# Patient Record
Sex: MALE | Race: WHITE | NOT HISPANIC OR LATINO | URBAN - METROPOLITAN AREA
[De-identification: names, ages, dates, MRNs, and addresses within clinical notes are randomized per-mention and may not be internally consistent; named-entity substitution may affect disease eponyms.]

---

## 2024-10-09 ENCOUNTER — NURSING HOME VISIT (OUTPATIENT)
Dept: POST ACUTE CARE | Facility: EXTERNAL LOCATION | Age: 64
End: 2024-10-09
Payer: COMMERCIAL

## 2024-10-09 DIAGNOSIS — R53.1 WEAKNESS: ICD-10-CM

## 2024-10-09 DIAGNOSIS — G47.33 OSA (OBSTRUCTIVE SLEEP APNEA): ICD-10-CM

## 2024-10-09 DIAGNOSIS — J43.1 PANLOBULAR EMPHYSEMA (MULTI): ICD-10-CM

## 2024-10-09 DIAGNOSIS — I50.32 CHRONIC DIASTOLIC HEART FAILURE: ICD-10-CM

## 2024-10-09 DIAGNOSIS — E11.9 TYPE 2 DIABETES MELLITUS WITHOUT COMPLICATION, WITHOUT LONG-TERM CURRENT USE OF INSULIN (MULTI): ICD-10-CM

## 2024-10-09 DIAGNOSIS — K70.31 ALCOHOLIC CIRRHOSIS OF LIVER WITH ASCITES (MULTI): Primary | ICD-10-CM

## 2024-10-09 PROCEDURE — 99305 1ST NF CARE MODERATE MDM 35: CPT | Performed by: INTERNAL MEDICINE

## 2024-10-18 ENCOUNTER — NURSING HOME VISIT (OUTPATIENT)
Dept: POST ACUTE CARE | Facility: EXTERNAL LOCATION | Age: 64
End: 2024-10-18
Payer: COMMERCIAL

## 2024-10-18 DIAGNOSIS — E11.9 TYPE 2 DIABETES MELLITUS WITHOUT COMPLICATION, WITH LONG-TERM CURRENT USE OF INSULIN (MULTI): ICD-10-CM

## 2024-10-18 DIAGNOSIS — J43.9 PULMONARY EMPHYSEMA, UNSPECIFIED EMPHYSEMA TYPE (MULTI): Primary | ICD-10-CM

## 2024-10-18 DIAGNOSIS — Z79.4 TYPE 2 DIABETES MELLITUS WITHOUT COMPLICATION, WITH LONG-TERM CURRENT USE OF INSULIN (MULTI): ICD-10-CM

## 2024-10-18 DIAGNOSIS — K70.31 ALCOHOLIC CIRRHOSIS OF LIVER WITH ASCITES (MULTI): ICD-10-CM

## 2024-10-18 DIAGNOSIS — I50.32 CHRONIC DIASTOLIC HEART FAILURE: ICD-10-CM

## 2024-10-18 PROCEDURE — 99309 SBSQ NF CARE MODERATE MDM 30: CPT

## 2024-10-18 NOTE — LETTER
Patient: Axel Boles  : 1960    Encounter Date: 10/18/2024    PROGRESS NOTE    Subjective  Chief complaint: Axel Boles is a 64 y.o. male who is an acute skilled patient being seen and evaluated for weakness    HPI:  10/18/24  Patient offers no complaints today.  Continues to work with therapy for strengthening and GT.  Ambulated 100' X2 with rollator.  Tolerating trilogy well - 4 hours/day. + ascites- last paracentesis 2 weeks ago.  No F/C/N/V/D, SOB.          Objective  Vital signs:  122/68, 97.8, 73, 18, 97%    Physical Exam  Constitutional:       Appearance: Normal appearance.   HENT:      Head: Normocephalic.      Mouth/Throat:      Mouth: Mucous membranes are moist.   Eyes:      Extraocular Movements: Extraocular movements intact.   Cardiovascular:      Rate and Rhythm: Normal rate and regular rhythm.      Pulses: Normal pulses.      Heart sounds: Normal heart sounds.   Pulmonary:      Effort: Pulmonary effort is normal.      Breath sounds: Normal breath sounds.      Comments: .  Diminished throughout  Abdominal:      General: Bowel sounds are normal. There is distension (Ascites).      Palpations: Abdomen is soft.   Musculoskeletal:         General: Normal range of motion.      Cervical back: Normal range of motion and neck supple.      Right lower leg: Edema present.      Left lower leg: Edema present.   Skin:     General: Skin is warm and dry.      Capillary Refill: Capillary refill takes less than 2 seconds.      Comments: .  Wounds to BLE   Neurological:      Mental Status: He is alert and oriented to person, place, and time.   Psychiatric:         Mood and Affect: Mood normal.         Behavior: Behavior normal.         Assessment/Plan  Problem List Items Addressed This Visit       COPD (chronic obstructive pulmonary disease) (Multi) - Primary      Stable   Continuous O2   encourage increased time with trilogy   Advair, albuterol, Spiriva   monitor sats         Type 2 diabetes mellitus  without complication (Multi)         no S/S hyper or hypoglycemia   continue glipizide, lispro SSI, glargine   monitor BG AC/at bedtime   Routine HG A1c         Chronic diastolic heart failure      Stable   DIPAK diet   fluid restriction   monitor weights   BP control   Lasix, spironolactone         Alcoholic cirrhosis of liver with ascites (Multi)      Stable   Ascites- last paracentesis 2 weeks ago   monitor labs   scheduled paracentesis 10/28/2024   continue lactulose          Medications, treatments, and labs reviewed  Continue medications and treatments as listed in EMR    BEVERLY Adorno      Electronically Signed By: BEVERLY Adorno   10/20/24  9:29 AM

## 2024-10-20 PROBLEM — J43.0 UNILATERAL EMPHYSEMA (MULTI): Status: ACTIVE | Noted: 2024-10-20

## 2024-10-20 PROBLEM — R53.1 WEAKNESS: Status: ACTIVE | Noted: 2024-10-20

## 2024-10-20 PROBLEM — R23.9 ALTERATION IN SKIN INTEGRITY: Status: ACTIVE | Noted: 2024-10-20

## 2024-10-20 PROBLEM — I50.32 CHRONIC DIASTOLIC HEART FAILURE: Status: ACTIVE | Noted: 2024-10-20

## 2024-10-20 PROBLEM — J44.9 COPD (CHRONIC OBSTRUCTIVE PULMONARY DISEASE) (MULTI): Status: ACTIVE | Noted: 2024-10-20

## 2024-10-20 PROBLEM — E11.9 TYPE 2 DIABETES MELLITUS WITHOUT COMPLICATION (MULTI): Status: ACTIVE | Noted: 2024-10-20

## 2024-10-20 PROBLEM — I86.4 GASTRIC VARICES WITHOUT BLEEDING: Status: ACTIVE | Noted: 2024-10-20

## 2024-10-20 PROBLEM — B18.2 CHRONIC HEPATITIS C WITHOUT HEPATIC COMA (MULTI): Status: ACTIVE | Noted: 2024-10-20

## 2024-10-20 PROBLEM — K57.90: Status: ACTIVE | Noted: 2024-10-20

## 2024-10-20 PROBLEM — G47.33 OSA (OBSTRUCTIVE SLEEP APNEA): Status: ACTIVE | Noted: 2024-10-20

## 2024-10-20 PROBLEM — K70.31 ALCOHOLIC CIRRHOSIS OF LIVER WITH ASCITES (MULTI): Status: ACTIVE | Noted: 2024-10-20

## 2024-10-20 NOTE — ASSESSMENT & PLAN NOTE
Stable   Ascites- last paracentesis 2 weeks ago   monitor labs   scheduled paracentesis 10/28/2024   continue lactulose

## 2024-10-20 NOTE — ASSESSMENT & PLAN NOTE
Stable   Continuous O2   encourage increased time with trilogy   Advair, albuterol, Spiriva   monitor sats

## 2024-10-20 NOTE — PROGRESS NOTES
PROGRESS NOTE    Subjective   Chief complaint: Axel Boles is a 64 y.o. male who is an acute skilled patient being seen and evaluated for weakness    HPI:  10/18/24  Patient offers no complaints today.  Continues to work with therapy for strengthening and GT.  Ambulated 100' X2 with rollator.  Tolerating trilogy well - 4 hours/day. + ascites- last paracentesis 2 weeks ago.  No F/C/N/V/D, SOB.          Objective   Vital signs:  122/68, 97.8, 73, 18, 97%    Physical Exam  Constitutional:       Appearance: Normal appearance.   HENT:      Head: Normocephalic.      Mouth/Throat:      Mouth: Mucous membranes are moist.   Eyes:      Extraocular Movements: Extraocular movements intact.   Cardiovascular:      Rate and Rhythm: Normal rate and regular rhythm.      Pulses: Normal pulses.      Heart sounds: Normal heart sounds.   Pulmonary:      Effort: Pulmonary effort is normal.      Breath sounds: Normal breath sounds.      Comments: .  Diminished throughout  Abdominal:      General: Bowel sounds are normal. There is distension (Ascites).      Palpations: Abdomen is soft.   Musculoskeletal:         General: Normal range of motion.      Cervical back: Normal range of motion and neck supple.      Right lower leg: Edema present.      Left lower leg: Edema present.   Skin:     General: Skin is warm and dry.      Capillary Refill: Capillary refill takes less than 2 seconds.      Comments: .  Wounds to BLE   Neurological:      Mental Status: He is alert and oriented to person, place, and time.   Psychiatric:         Mood and Affect: Mood normal.         Behavior: Behavior normal.         Assessment/Plan   Problem List Items Addressed This Visit       COPD (chronic obstructive pulmonary disease) (Multi) - Primary      Stable   Continuous O2   encourage increased time with trilogy   Advair, albuterol, Spiriva   monitor sats         Type 2 diabetes mellitus without complication (Multi)         no S/S hyper or hypoglycemia    continue glipizide, lispro SSI, glargine   monitor BG AC/at bedtime   Routine HG A1c         Chronic diastolic heart failure      Stable   DIPAK diet   fluid restriction   monitor weights   BP control   Lasix, spironolactone         Alcoholic cirrhosis of liver with ascites (Multi)      Stable   Ascites- last paracentesis 2 weeks ago   monitor labs   scheduled paracentesis 10/28/2024   continue lactulose          Medications, treatments, and labs reviewed  Continue medications and treatments as listed in EMR    Dolores Miles, APRN-CNP

## 2024-10-20 NOTE — ASSESSMENT & PLAN NOTE
no S/S hyper or hypoglycemia   continue glipizide, lispro SSI, glargine   monitor BG AC/at bedtime   Routine HG A1c

## 2024-10-22 ENCOUNTER — NURSING HOME VISIT (OUTPATIENT)
Dept: POST ACUTE CARE | Facility: EXTERNAL LOCATION | Age: 64
End: 2024-10-22
Payer: COMMERCIAL

## 2024-10-22 DIAGNOSIS — E11.9 TYPE 2 DIABETES MELLITUS WITHOUT COMPLICATION, WITHOUT LONG-TERM CURRENT USE OF INSULIN (MULTI): ICD-10-CM

## 2024-10-22 DIAGNOSIS — J41.8 MIXED SIMPLE AND MUCOPURULENT CHRONIC BRONCHITIS (MULTI): ICD-10-CM

## 2024-10-22 DIAGNOSIS — I50.32 CHRONIC DIASTOLIC HEART FAILURE: ICD-10-CM

## 2024-10-22 DIAGNOSIS — R23.9 ALTERATION IN SKIN INTEGRITY: ICD-10-CM

## 2024-10-22 DIAGNOSIS — L60.0 INGROWN TOENAIL: ICD-10-CM

## 2024-10-22 DIAGNOSIS — H26.9 CATARACT OF BOTH EYES, UNSPECIFIED CATARACT TYPE: Primary | ICD-10-CM

## 2024-10-22 DIAGNOSIS — R53.1 WEAKNESS: ICD-10-CM

## 2024-10-22 PROCEDURE — 99309 SBSQ NF CARE MODERATE MDM 30: CPT

## 2024-10-22 NOTE — LETTER
Patient: Axel Boles  : 1960    Encounter Date: 10/22/2024    PROGRESS NOTE    Subjective  Chief complaint: Axel Boles is a 64 y.o. male who is an acute skilled patient being seen and evaluated for weakness    HPI:  10/18/24  Patient offers no complaints today.  Continues to work with therapy for strengthening and GT.  Ambulated 100' X2 with rollator.  Tolerating trilogy well - 4 hours/day. + ascites- last paracentesis 2 weeks ago.  No F/C/N/V/D, SOB.    10/22/24 Patient continues to work with therapy for strengthening and gait training.  Tolerating Trelegy well. DM stable - .  No F/C/N/C/C, SOB.          Objective  Vital signs:   132/78, 97.2, 79, 18, 98%    Physical Exam  Constitutional:       Appearance: Normal appearance.   HENT:      Head: Normocephalic.      Mouth/Throat:      Mouth: Mucous membranes are moist.   Eyes:      Extraocular Movements: Extraocular movements intact.   Cardiovascular:      Rate and Rhythm: Normal rate and regular rhythm.      Pulses: Normal pulses.      Heart sounds: Normal heart sounds.   Pulmonary:      Effort: Pulmonary effort is normal.      Breath sounds: Normal breath sounds.      Comments: .  Diminished throughout  Abdominal:      General: Bowel sounds are normal. There is distension (Ascites).      Palpations: Abdomen is soft.   Musculoskeletal:         General: Normal range of motion.      Cervical back: Normal range of motion and neck supple.      Right lower leg: Edema present.      Left lower leg: Edema present.   Skin:     General: Skin is warm and dry.      Capillary Refill: Capillary refill takes less than 2 seconds.      Comments: .  Wounds to BLE   Neurological:      Mental Status: He is alert and oriented to person, place, and time.   Psychiatric:         Mood and Affect: Mood normal.         Behavior: Behavior normal.         Assessment/Plan  Problem List Items Addressed This Visit       COPD (chronic obstructive pulmonary disease) (Multi)       Stable   Continuous O2   encourage increased time with trilogy   Advair, albuterol, Spiriva   monitor sats         Type 2 diabetes mellitus without complication (Multi)         no S/S hyper or hypoglycemia   continue glipizide, lispro SSI, glargine   monitor BG AC/at bedtime   Routine HG A1c         Chronic diastolic heart failure      Stable   DIPAK diet   fluid restriction   monitor weights   BP control   Lasix, spironolactone         Weakness      therapy         Alteration in skin integrity      cleanse wound with NS, pat dry   Aquaphor         Cataracts, bilateral - Primary      Patient states difficulty driving due to nighttime glare and dark vision for > 1 year   diagnosed years ago with early stage cataracts with no further follow up   360 ophthalmology consult           Ingrown toenail      podiatry consult          Medications, treatments, and labs reviewed  Continue medications and treatments as listed in EMR    BEVERLY Adorno      Electronically Signed By: BEVERLY Adorno   10/22/24 10:41 PM

## 2024-10-23 NOTE — ASSESSMENT & PLAN NOTE
Patient states difficulty driving due to nighttime glare and dark vision for > 1 year   diagnosed years ago with early stage cataracts with no further follow up   360 ophthalmology consult

## 2024-10-23 NOTE — PROGRESS NOTES
PROGRESS NOTE    Subjective   Chief complaint: Axel Boles is a 64 y.o. male who is an acute skilled patient being seen and evaluated for weakness    HPI:  10/18/24  Patient offers no complaints today.  Continues to work with therapy for strengthening and GT.  Ambulated 100' X2 with rollator.  Tolerating trilogy well - 4 hours/day. + ascites- last paracentesis 2 weeks ago.  No F/C/N/V/D, SOB.    10/22/24 Patient continues to work with therapy for strengthening and gait training.  Tolerating Trelegy well. DM stable - .  No F/C/N/C/C, SOB.          Objective   Vital signs:   132/78, 97.2, 79, 18, 98%    Physical Exam  Constitutional:       Appearance: Normal appearance.   HENT:      Head: Normocephalic.      Mouth/Throat:      Mouth: Mucous membranes are moist.   Eyes:      Extraocular Movements: Extraocular movements intact.   Cardiovascular:      Rate and Rhythm: Normal rate and regular rhythm.      Pulses: Normal pulses.      Heart sounds: Normal heart sounds.   Pulmonary:      Effort: Pulmonary effort is normal.      Breath sounds: Normal breath sounds.      Comments: .  Diminished throughout  Abdominal:      General: Bowel sounds are normal. There is distension (Ascites).      Palpations: Abdomen is soft.   Musculoskeletal:         General: Normal range of motion.      Cervical back: Normal range of motion and neck supple.      Right lower leg: Edema present.      Left lower leg: Edema present.   Skin:     General: Skin is warm and dry.      Capillary Refill: Capillary refill takes less than 2 seconds.      Comments: .  Wounds to BLE   Neurological:      Mental Status: He is alert and oriented to person, place, and time.   Psychiatric:         Mood and Affect: Mood normal.         Behavior: Behavior normal.         Assessment/Plan   Problem List Items Addressed This Visit       COPD (chronic obstructive pulmonary disease) (Multi)      Stable   Continuous O2   encourage increased time with trilogy    Advair, albuterol, Spiriva   monitor sats         Type 2 diabetes mellitus without complication (Multi)         no S/S hyper or hypoglycemia   continue glipizide, lispro SSI, glargine   monitor BG AC/at bedtime   Routine HG A1c         Chronic diastolic heart failure      Stable   DIPAK diet   fluid restriction   monitor weights   BP control   Lasix, spironolactone         Weakness      therapy         Alteration in skin integrity      cleanse wound with NS, pat dry   Aquaphor         Cataracts, bilateral - Primary      Patient states difficulty driving due to nighttime glare and dark vision for > 1 year   diagnosed years ago with early stage cataracts with no further follow up   360 ophthalmology consult           Ingrown toenail      podiatry consult          Medications, treatments, and labs reviewed  Continue medications and treatments as listed in EMR    Dolores Miles, APRN-CNP

## 2024-10-24 ENCOUNTER — DOCUMENTATION (OUTPATIENT)
Dept: PULMONOLOGY | Facility: HOSPITAL | Age: 64
End: 2024-10-24
Payer: COMMERCIAL

## 2024-10-29 ENCOUNTER — NURSING HOME VISIT (OUTPATIENT)
Dept: POST ACUTE CARE | Facility: EXTERNAL LOCATION | Age: 64
End: 2024-10-29
Payer: COMMERCIAL

## 2024-10-29 DIAGNOSIS — J41.8 MIXED SIMPLE AND MUCOPURULENT CHRONIC BRONCHITIS (MULTI): ICD-10-CM

## 2024-10-29 DIAGNOSIS — K70.31 ALCOHOLIC CIRRHOSIS OF LIVER WITH ASCITES (MULTI): Primary | ICD-10-CM

## 2024-10-29 DIAGNOSIS — Z79.4 TYPE 2 DIABETES MELLITUS WITHOUT COMPLICATION, WITH LONG-TERM CURRENT USE OF INSULIN (MULTI): ICD-10-CM

## 2024-10-29 DIAGNOSIS — R04.0 NASAL BLEEDING: ICD-10-CM

## 2024-10-29 DIAGNOSIS — E11.9 TYPE 2 DIABETES MELLITUS WITHOUT COMPLICATION, WITH LONG-TERM CURRENT USE OF INSULIN (MULTI): ICD-10-CM

## 2024-10-29 PROCEDURE — 99309 SBSQ NF CARE MODERATE MDM 30: CPT

## 2024-11-19 ENCOUNTER — NURSING HOME VISIT (OUTPATIENT)
Dept: POST ACUTE CARE | Facility: EXTERNAL LOCATION | Age: 64
End: 2024-11-19
Payer: COMMERCIAL

## 2024-11-19 DIAGNOSIS — I50.32 CHRONIC DIASTOLIC HEART FAILURE: ICD-10-CM

## 2024-11-19 DIAGNOSIS — E11.9 TYPE 2 DIABETES MELLITUS WITHOUT COMPLICATION, WITHOUT LONG-TERM CURRENT USE OF INSULIN (MULTI): ICD-10-CM

## 2024-11-19 DIAGNOSIS — K70.31 ALCOHOLIC CIRRHOSIS OF LIVER WITH ASCITES (MULTI): Primary | ICD-10-CM

## 2024-11-19 DIAGNOSIS — J41.8 MIXED SIMPLE AND MUCOPURULENT CHRONIC BRONCHITIS (MULTI): ICD-10-CM

## 2024-11-19 PROCEDURE — 99309 SBSQ NF CARE MODERATE MDM 30: CPT

## 2024-11-19 NOTE — LETTER
Patient: Axel Boles  : 1960    Encounter Date: 2024    PROGRESS NOTE    Subjective  Chief complaint: Axel Boles is a 64 y.o. male who is a long term care patient being seen and evaluated for general medical care and monthly follow up    HPI:  Patient seen and evaluated for general medical care and monthly follow-up.  Patient continues working on tolerance for trilogy with O2 2L NC in between.  States improvements.  Recent labs WNL.  Offers no complaints of F/C/N/V/D, SOB, chest pain.  Abdomen remains soft.  Appetite stable          Objective  Vital signs:  111/65, 97.1, 82, 18, 95% - 114    Physical Exam  Constitutional:       Appearance: Normal appearance.   HENT:      Head: Normocephalic.      Mouth/Throat:      Mouth: Mucous membranes are moist.   Eyes:      Extraocular Movements: Extraocular movements intact.   Cardiovascular:      Rate and Rhythm: Normal rate and regular rhythm.      Pulses: Normal pulses.      Heart sounds: Normal heart sounds.   Pulmonary:      Effort: Pulmonary effort is normal.      Breath sounds: Normal breath sounds.      Comments: .  Diminished throughout  Abdominal:      General: Bowel sounds are normal. There is distension (Ascites).      Palpations: Abdomen is soft.   Musculoskeletal:         General: Normal range of motion.      Cervical back: Normal range of motion and neck supple.      Right lower leg: Edema present.      Left lower leg: Edema present.   Skin:     General: Skin is warm and dry.      Capillary Refill: Capillary refill takes less than 2 seconds.      Comments: .  Wounds to BLE   Neurological:      Mental Status: He is alert and oriented to person, place, and time.   Psychiatric:         Mood and Affect: Mood normal.         Behavior: Behavior normal.         Assessment/Plan  Problem List Items Addressed This Visit       COPD (chronic obstructive pulmonary disease) (Multi)      Continuous O2   encourage increased time with trilogy   Advair,  albuterol, Spiriva   monitor sats   Pulmonology following         Type 2 diabetes mellitus without complication (Multi)         no S/S hyper or hypoglycemia   continue glipizide, lispro SSI, glargine   monitor BG AC/at bedtime   Routine HG A1c         Chronic diastolic heart failure      Stable   DIPAK diet   fluid restriction   monitor weights   BP control   Lasix, spironolactone         Alcoholic cirrhosis of liver with ascites (Multi) - Primary     No F/C/SOB, abdominal pain  Last paracentesis 10/28/2024 with 1 L removed            Medications, treatments, and labs reviewed  Continue medications and treatments as listed in EMR    BEVERLY Adorno      Electronically Signed By: BVEERLY Adorno   11/26/24  7:22 PM

## 2024-11-25 ENCOUNTER — NURSING HOME VISIT (OUTPATIENT)
Dept: POST ACUTE CARE | Facility: EXTERNAL LOCATION | Age: 64
End: 2024-11-25
Payer: COMMERCIAL

## 2024-11-25 DIAGNOSIS — R60.0 LOCALIZED EDEMA: ICD-10-CM

## 2024-11-25 DIAGNOSIS — J41.8 MIXED SIMPLE AND MUCOPURULENT CHRONIC BRONCHITIS (MULTI): ICD-10-CM

## 2024-11-25 DIAGNOSIS — E11.9 TYPE 2 DIABETES MELLITUS WITHOUT COMPLICATION, WITHOUT LONG-TERM CURRENT USE OF INSULIN (MULTI): ICD-10-CM

## 2024-11-25 DIAGNOSIS — J98.8 CONGESTION OF RESPIRATORY TRACT: Primary | ICD-10-CM

## 2024-11-25 PROCEDURE — 99309 SBSQ NF CARE MODERATE MDM 30: CPT

## 2024-11-25 NOTE — LETTER
Patient: Axel Boles  : 1960    Encounter Date: 2024    PROGRESS NOTE    Subjective  Chief complaint: Axel Boles is a 64 y.o. male who is a long term care patient being seen and evaluated for  congestion    HPI:  Patient seen and evaluated for productive cough and SOB.  CXR obtained- negative for PNA/CHF.  Pending labs.  Offers no complaints of F/C/N/V/D, chest pain, fatigue.  DM stable - .  No other concerns per nursing          Objective  Vital signs:  138/74, 97.6, 80, 18, 95%    Physical Exam  Constitutional:       Appearance: Normal appearance.   HENT:      Head: Normocephalic.      Mouth/Throat:      Mouth: Mucous membranes are moist.   Eyes:      Extraocular Movements: Extraocular movements intact.   Cardiovascular:      Rate and Rhythm: Normal rate and regular rhythm.      Pulses: Normal pulses.      Heart sounds: Normal heart sounds.   Pulmonary:      Effort: Pulmonary effort is normal.      Breath sounds: Normal breath sounds.      Comments: .  Diminished throughout  Abdominal:      General: Bowel sounds are normal. There is distension (Ascites).      Palpations: Abdomen is soft.   Musculoskeletal:         General: Normal range of motion.      Cervical back: Normal range of motion and neck supple.      Right lower leg: Edema present.      Left lower leg: Edema present.   Skin:     General: Skin is warm and dry.      Capillary Refill: Capillary refill takes less than 2 seconds.      Comments: .  Wounds to BLE   Neurological:      Mental Status: He is alert and oriented to person, place, and time.   Psychiatric:         Mood and Affect: Mood normal.         Behavior: Behavior normal.         Assessment/Plan  Problem List Items Addressed This Visit       COPD (chronic obstructive pulmonary disease) (Multi)      Continuous O2   encourage increased time with trilogy   Advair, albuterol, Spiriva   monitor sats   Pulmonology following         Type 2 diabetes mellitus without  complication (Multi)         no S/S hyper or hypoglycemia   continue glipizide, lispro SSI, glargine   monitor BG AC/at bedtime   Routine HG A1c         Congestion of respiratory tract - Primary      CXR negative for PNA/CHF   continue albuterol, guaifenesin   monitor sats         Localized edema      continue elevation and compression hose   Lasix   monitor          Medications, treatments, and labs reviewed  Continue medications and treatments as listed in EMR    BEVERLY Adorno      Electronically Signed By: BEVERLY Adorno   11/26/24  7:33 PM

## 2024-11-26 PROBLEM — R60.0 LOCALIZED EDEMA: Status: ACTIVE | Noted: 2024-11-26

## 2024-11-26 PROBLEM — J98.8 CONGESTION OF RESPIRATORY TRACT: Status: ACTIVE | Noted: 2024-11-26

## 2024-11-27 NOTE — ASSESSMENT & PLAN NOTE
Continuous O2   encourage increased time with trilogy   Advair, albuterol, Spiriva   monitor sats   Pulmonology following

## 2024-11-27 NOTE — PROGRESS NOTES
PROGRESS NOTE    Subjective   Chief complaint: Axel Boles is a 64 y.o. male who is a long term care patient being seen and evaluated for  congestion    HPI:  Patient seen and evaluated for productive cough and SOB.  CXR obtained- negative for PNA/CHF.  Pending labs.  Offers no complaints of F/C/N/V/D, chest pain, fatigue.  DM stable - .  No other concerns per nursing          Objective   Vital signs:  138/74, 97.6, 80, 18, 95%    Physical Exam  Constitutional:       Appearance: Normal appearance.   HENT:      Head: Normocephalic.      Mouth/Throat:      Mouth: Mucous membranes are moist.   Eyes:      Extraocular Movements: Extraocular movements intact.   Cardiovascular:      Rate and Rhythm: Normal rate and regular rhythm.      Pulses: Normal pulses.      Heart sounds: Normal heart sounds.   Pulmonary:      Effort: Pulmonary effort is normal.      Breath sounds: Normal breath sounds.      Comments: .  Diminished throughout  Abdominal:      General: Bowel sounds are normal. There is distension (Ascites).      Palpations: Abdomen is soft.   Musculoskeletal:         General: Normal range of motion.      Cervical back: Normal range of motion and neck supple.      Right lower leg: Edema present.      Left lower leg: Edema present.   Skin:     General: Skin is warm and dry.      Capillary Refill: Capillary refill takes less than 2 seconds.      Comments: .  Wounds to BLE   Neurological:      Mental Status: He is alert and oriented to person, place, and time.   Psychiatric:         Mood and Affect: Mood normal.         Behavior: Behavior normal.         Assessment/Plan   Problem List Items Addressed This Visit       COPD (chronic obstructive pulmonary disease) (Multi)      Continuous O2   encourage increased time with trilogy   Advair, albuterol, Spiriva   monitor sats   Pulmonology following         Type 2 diabetes mellitus without complication (Multi)         no S/S hyper or hypoglycemia   continue  glipizide, lispro SSI, glargine   monitor BG AC/at bedtime   Routine HG A1c         Congestion of respiratory tract - Primary      CXR negative for PNA/CHF   continue albuterol, guaifenesin   monitor sats         Localized edema      continue elevation and compression hose   Lasix   monitor          Medications, treatments, and labs reviewed  Continue medications and treatments as listed in EMR    Dolores Miles, APRN-CNP

## 2024-11-27 NOTE — PROGRESS NOTES
PROGRESS NOTE    Subjective   Chief complaint: Axel Boles is a 64 y.o. male who is a long term care patient being seen and evaluated for general medical care and monthly follow up    HPI:  Patient seen and evaluated for general medical care and monthly follow-up.  Patient continues working on tolerance for trilogy with O2 2L NC in between.  States improvements.  Recent labs WNL.  Offers no complaints of F/C/N/V/D, SOB, chest pain.  Abdomen remains soft.  Appetite stable          Objective   Vital signs:  111/65, 97.1, 82, 18, 95% - 114    Physical Exam  Constitutional:       Appearance: Normal appearance.   HENT:      Head: Normocephalic.      Mouth/Throat:      Mouth: Mucous membranes are moist.   Eyes:      Extraocular Movements: Extraocular movements intact.   Cardiovascular:      Rate and Rhythm: Normal rate and regular rhythm.      Pulses: Normal pulses.      Heart sounds: Normal heart sounds.   Pulmonary:      Effort: Pulmonary effort is normal.      Breath sounds: Normal breath sounds.      Comments: .  Diminished throughout  Abdominal:      General: Bowel sounds are normal. There is distension (Ascites).      Palpations: Abdomen is soft.   Musculoskeletal:         General: Normal range of motion.      Cervical back: Normal range of motion and neck supple.      Right lower leg: Edema present.      Left lower leg: Edema present.   Skin:     General: Skin is warm and dry.      Capillary Refill: Capillary refill takes less than 2 seconds.      Comments: .  Wounds to BLE   Neurological:      Mental Status: He is alert and oriented to person, place, and time.   Psychiatric:         Mood and Affect: Mood normal.         Behavior: Behavior normal.         Assessment/Plan   Problem List Items Addressed This Visit       COPD (chronic obstructive pulmonary disease) (Multi)      Continuous O2   encourage increased time with trilogy   Advair, albuterol, Spiriva   monitor sats   Pulmonology following         Type 2  diabetes mellitus without complication (Multi)         no S/S hyper or hypoglycemia   continue glipizide, lispro SSI, glargine   monitor BG AC/at bedtime   Routine HG A1c         Chronic diastolic heart failure      Stable   DIPAK diet   fluid restriction   monitor weights   BP control   Lasix, spironolactone         Alcoholic cirrhosis of liver with ascites (Multi) - Primary     No F/C/SOB, abdominal pain  Last paracentesis 10/28/2024 with 1 L removed            Medications, treatments, and labs reviewed  Continue medications and treatments as listed in EMR    Dolores Miles, APRN-CNP

## 2024-12-03 ENCOUNTER — NURSING HOME VISIT (OUTPATIENT)
Dept: POST ACUTE CARE | Facility: EXTERNAL LOCATION | Age: 64
End: 2024-12-03
Payer: COMMERCIAL

## 2024-12-03 DIAGNOSIS — E11.9 TYPE 2 DIABETES MELLITUS WITHOUT COMPLICATION, UNSPECIFIED WHETHER LONG TERM INSULIN USE (MULTI): ICD-10-CM

## 2024-12-03 DIAGNOSIS — J18.9 PNEUMONIA OF BOTH LOWER LOBES DUE TO INFECTIOUS ORGANISM: Primary | ICD-10-CM

## 2024-12-03 DIAGNOSIS — R60.0 LOCALIZED EDEMA: ICD-10-CM

## 2024-12-03 DIAGNOSIS — I50.32 CHRONIC DIASTOLIC HEART FAILURE: ICD-10-CM

## 2024-12-03 NOTE — LETTER
Patient: Axel Boles  : 1960    Encounter Date: 2024    PROGRESS NOTE    Subjective  Chief complaint: Axel Boles is a 64 y.o. male who is a long term care patient being seen and evaluated for PNA    HPI:  Patient seen and evaluated for PNA. Tolerating Levaquin well.  Patient continues to use trilogy and supplemental oxygen. Offers no complaints today.  No F/C/N/V/D, SOB + nonproductive cough          Objective  Vital signs:  114/70, 97.9, 80, 18, 97%    Physical Exam  Constitutional:       Appearance: Normal appearance.   HENT:      Head: Normocephalic.      Mouth/Throat:      Mouth: Mucous membranes are moist.   Eyes:      Extraocular Movements: Extraocular movements intact.   Cardiovascular:      Rate and Rhythm: Normal rate and regular rhythm.      Pulses: Normal pulses.      Heart sounds: Normal heart sounds.   Pulmonary:      Effort: Pulmonary effort is normal.      Breath sounds: Normal breath sounds.      Comments: .  Diminished throughout  Abdominal:      General: Bowel sounds are normal. There is distension (Ascites).      Palpations: Abdomen is soft.   Musculoskeletal:         General: Normal range of motion.      Cervical back: Normal range of motion and neck supple.      Right lower leg: Edema present.      Left lower leg: Edema present.   Skin:     General: Skin is warm and dry.      Capillary Refill: Capillary refill takes less than 2 seconds.      Comments: .  Wounds to BLE   Neurological:      Mental Status: He is alert and oriented to person, place, and time.   Psychiatric:         Mood and Affect: Mood normal.         Behavior: Behavior normal.         Assessment/Plan  Problem List Items Addressed This Visit       Type 2 diabetes mellitus without complication (Multi)         no S/S hyper or hypoglycemia   continue glipizide, lispro SSI, glargine   monitor BG AC/at bedtime   Routine HG A1c         Chronic diastolic heart failure      Stable   DIPAK diet   fluid restriction    monitor weights   BP control   Lasix, spironolactone         Localized edema      continue elevation and compression hose   Lasix   monitor         Pneumonia of both lower lobes due to infectious organism - Primary      continue Levaquin   albuterol, guaifenesin   continuous O2   monitor sats          Medications, treatments, and labs reviewed  Continue medications and treatments as listed in EMR    BEVERLY Adorno      Electronically Signed By: BEVERLY Adorno   12/21/24 10:34 AM

## 2024-12-04 ENCOUNTER — NURSING HOME VISIT (OUTPATIENT)
Dept: POST ACUTE CARE | Facility: EXTERNAL LOCATION | Age: 64
End: 2024-12-04
Payer: COMMERCIAL

## 2024-12-04 DIAGNOSIS — E11.9 TYPE 2 DIABETES MELLITUS WITHOUT COMPLICATION, UNSPECIFIED WHETHER LONG TERM INSULIN USE (MULTI): ICD-10-CM

## 2024-12-04 DIAGNOSIS — J18.9 PNEUMONIA OF BOTH LOWER LOBES DUE TO INFECTIOUS ORGANISM: Primary | ICD-10-CM

## 2024-12-04 DIAGNOSIS — J41.8 MIXED SIMPLE AND MUCOPURULENT CHRONIC BRONCHITIS (MULTI): ICD-10-CM

## 2024-12-04 NOTE — LETTER
Patient: Axel Boles  : 1960    Encounter Date: 2024    PROGRESS NOTE    Subjective  Chief complaint: Axel Boles is a 64 y.o. male who is a long term care patient being seen and evaluated for  PNA    HPI:  Patient seen and evaluated for pneumonia.  Continues to tolerate Levaquin without issue.  Offers no complaints of F/C/N/V/D.  Appetite stable.  Continuous O2 in use.  Tolerating Trelegy at at bedtime.  No concerns per nursing          Objective  Vital signs:  114/70, 97.9, 88, 20, 97%    Physical Exam  Constitutional:       Appearance: Normal appearance.   HENT:      Head: Normocephalic.      Mouth/Throat:      Mouth: Mucous membranes are moist.   Eyes:      Extraocular Movements: Extraocular movements intact.   Cardiovascular:      Rate and Rhythm: Normal rate and regular rhythm.      Pulses: Normal pulses.      Heart sounds: Normal heart sounds.   Pulmonary:      Effort: Pulmonary effort is normal.      Breath sounds: Normal breath sounds.      Comments: .  Diminished throughout  Abdominal:      General: Bowel sounds are normal. There is distension (Ascites).      Palpations: Abdomen is soft.   Musculoskeletal:         General: Normal range of motion.      Cervical back: Normal range of motion and neck supple.      Right lower leg: Edema present.      Left lower leg: Edema present.   Skin:     General: Skin is warm and dry.      Capillary Refill: Capillary refill takes less than 2 seconds.      Comments: .  Wounds to BLE   Neurological:      Mental Status: He is alert and oriented to person, place, and time.   Psychiatric:         Mood and Affect: Mood normal.         Behavior: Behavior normal.         Assessment/Plan  Problem List Items Addressed This Visit       COPD (chronic obstructive pulmonary disease) (Multi)      Continuous O2   encourage increased time with trilogy   Advair, albuterol, Spiriva   monitor sats   Pulmonology following         Type 2 diabetes mellitus without  complication (Multi)         no S/S hyper or hypoglycemia   continue glipizide, lispro SSI, glargine   monitor BG AC/at bedtime   Routine HG A1c         Pneumonia of both lower lobes due to infectious organism - Primary      continue Levaquin   albuterol, guaifenesin   continuous O2   monitor sats          Medications, treatments, and labs reviewed  Continue medications and treatments as listed in EMR    BEVELRY Adorno      Electronically Signed By: BEVERLY Adorno   12/21/24 10:37 AM

## 2024-12-06 ENCOUNTER — NURSING HOME VISIT (OUTPATIENT)
Dept: POST ACUTE CARE | Facility: EXTERNAL LOCATION | Age: 64
End: 2024-12-06
Payer: COMMERCIAL

## 2024-12-06 DIAGNOSIS — J18.9 PNEUMONIA OF BOTH LOWER LOBES DUE TO INFECTIOUS ORGANISM: ICD-10-CM

## 2024-12-06 DIAGNOSIS — R23.9 ALTERATION IN SKIN INTEGRITY: ICD-10-CM

## 2024-12-06 DIAGNOSIS — I50.32 CHRONIC DIASTOLIC HEART FAILURE: ICD-10-CM

## 2024-12-06 DIAGNOSIS — J41.8 MIXED SIMPLE AND MUCOPURULENT CHRONIC BRONCHITIS (MULTI): ICD-10-CM

## 2024-12-06 DIAGNOSIS — K70.31 ALCOHOLIC CIRRHOSIS OF LIVER WITH ASCITES (MULTI): Primary | ICD-10-CM

## 2024-12-06 NOTE — LETTER
Patient: Axel Boles  : 1960    Encounter Date: 2024    PROGRESS NOTE    Subjective  Chief complaint: Axel Boles is a 64 y.o. male who is a long term care patient being seen and evaluated for PNA    HPI:  Patient seen and evaluated for PNA.  Tolerating Levaquin without issue.  Offers no complaints of SOB, F/C.  Nonproductive cough continues.  Patient states feeling better.  Abdomen soft, no fluid wave palpated.  Appetite good.  No concerns per nursing          Objective  Vital signs:  101/61, 97.3, 79, 20, 97%    Physical Exam  Constitutional:       Appearance: Normal appearance.   HENT:      Head: Normocephalic.      Mouth/Throat:      Mouth: Mucous membranes are moist.   Eyes:      Extraocular Movements: Extraocular movements intact.   Cardiovascular:      Rate and Rhythm: Normal rate and regular rhythm.      Pulses: Normal pulses.      Heart sounds: Normal heart sounds.   Pulmonary:      Effort: Pulmonary effort is normal.      Breath sounds: Normal breath sounds.      Comments: diminished  Abdominal:      General: Bowel sounds are normal. There is no distension (Ascites).      Palpations: Abdomen is soft.   Musculoskeletal:         General: Normal range of motion.      Cervical back: Normal range of motion and neck supple.      Right lower leg: Edema present.      Left lower leg: Edema present.   Skin:     General: Skin is warm and dry.      Capillary Refill: Capillary refill takes less than 2 seconds.      Comments: .  Wounds to BLE   Neurological:      Mental Status: He is alert and oriented to person, place, and time.   Psychiatric:         Mood and Affect: Mood normal.         Behavior: Behavior normal.         Assessment/Plan  Problem List Items Addressed This Visit       COPD (chronic obstructive pulmonary disease) (Multi)      Continuous O2   encourage increased time with trilogy   Advair, albuterol, Spiriva   monitor sats   Pulmonology following         Chronic diastolic heart  failure      Stable   DIPAK diet   fluid restriction   monitor weights   BP control   Lasix, spironolactone         Alcoholic cirrhosis of liver with ascites (Multi) - Primary     No F/C/SOB, abdominal pain  no fluid wave  Last paracentesis 10/28/2024 with 1 L removed           Alteration in skin integrity      cleanse wound with NS, pat dry   Aquaphor         Pneumonia of both lower lobes due to infectious organism      continue Levaquin   albuterol, guaifenesin   continuous O2   monitor sats          Medications, treatments, and labs reviewed  Continue medications and treatments as listed in EMR    BEVERLY Adorno      Electronically Signed By: BEVERLY Adorno   12/21/24 10:41 AM

## 2024-12-17 ENCOUNTER — NURSING HOME VISIT (OUTPATIENT)
Dept: POST ACUTE CARE | Facility: EXTERNAL LOCATION | Age: 64
End: 2024-12-17
Payer: COMMERCIAL

## 2024-12-17 DIAGNOSIS — R60.0 LOCALIZED EDEMA: ICD-10-CM

## 2024-12-17 DIAGNOSIS — K70.31 ALCOHOLIC CIRRHOSIS OF LIVER WITH ASCITES (MULTI): Primary | ICD-10-CM

## 2024-12-17 DIAGNOSIS — I50.32 CHRONIC DIASTOLIC HEART FAILURE: ICD-10-CM

## 2024-12-17 DIAGNOSIS — J41.8 MIXED SIMPLE AND MUCOPURULENT CHRONIC BRONCHITIS (MULTI): ICD-10-CM

## 2024-12-17 DIAGNOSIS — E11.9 TYPE 2 DIABETES MELLITUS WITHOUT COMPLICATION, UNSPECIFIED WHETHER LONG TERM INSULIN USE (MULTI): ICD-10-CM

## 2024-12-17 NOTE — LETTER
Patient: Axel Boles  : 1960    Encounter Date: 2024    PROGRESS NOTE    Subjective  Chief complaint: Axel Boles is a 64 y.o. male who is a long term care patient being seen and evaluated for general medical care and monthly follow-up    HPI:  Patient seen and evaluated for general medical care and monthly follow-up.  Patient continues working with [employer] with O2 2L NC during day.  States feeling better with Trelegy.  Recent labs WNL.  Offers no complaints of F/C/N/V/D, SOB, chest pain.  Abdomen remains soft.  Appetite stable          Objective  Vital signs:  114/66, 98.0, 75, 18, 97%    Physical Exam  Constitutional:       Appearance: Normal appearance.   HENT:      Head: Normocephalic.      Mouth/Throat:      Mouth: Mucous membranes are moist.   Eyes:      Extraocular Movements: Extraocular movements intact.   Cardiovascular:      Rate and Rhythm: Normal rate and regular rhythm.      Pulses: Normal pulses.      Heart sounds: Normal heart sounds.   Pulmonary:      Effort: Pulmonary effort is normal.      Breath sounds: Normal breath sounds.      Comments: diminished  Abdominal:      General: Bowel sounds are normal. There is no distension (Ascites).      Palpations: Abdomen is soft.   Musculoskeletal:         General: Normal range of motion.      Cervical back: Normal range of motion and neck supple.      Right lower leg: Edema present.      Left lower leg: Edema present.   Skin:     General: Skin is warm and dry.      Capillary Refill: Capillary refill takes less than 2 seconds.      Comments: .  Wounds to BLE   Neurological:      Mental Status: He is alert and oriented to person, place, and time.   Psychiatric:         Mood and Affect: Mood normal.         Behavior: Behavior normal.         Assessment/Plan  Problem List Items Addressed This Visit       COPD (chronic obstructive pulmonary disease) (Multi)      Continuous O2   encourage increased time with trilogy   Advair, albuterol,  Spiriva   monitor sats   Pulmonology following         Type 2 diabetes mellitus without complication (Multi)         no S/S hyper or hypoglycemia   continue glipizide, lispro SSI, glargine   monitor BG AC/at bedtime   Routine HG A1c         Chronic diastolic heart failure      Stable   DIPAK diet   fluid restriction   monitor weights   BP control   Lasix, spironolactone         Alcoholic cirrhosis of liver with ascites (Multi) - Primary     No F/C/SOB, abdominal pain  no fluid wave  Last paracentesis 10/28/2024 with 1 L removed           Localized edema      continue elevation and compression hose   Lasix   monitor          Medications, treatments, and labs reviewed  Continue medications and treatments as listed in EMR    BEVERLY Adorno      Electronically Signed By: BEVERLY Adorno   12/21/24 10:48 AM

## 2024-12-20 ENCOUNTER — NURSING HOME VISIT (OUTPATIENT)
Dept: POST ACUTE CARE | Facility: EXTERNAL LOCATION | Age: 64
End: 2024-12-20
Payer: COMMERCIAL

## 2024-12-20 DIAGNOSIS — J41.8 MIXED SIMPLE AND MUCOPURULENT CHRONIC BRONCHITIS (MULTI): ICD-10-CM

## 2024-12-20 DIAGNOSIS — R60.0 LOCALIZED EDEMA: ICD-10-CM

## 2024-12-20 DIAGNOSIS — J98.8 CONGESTION OF RESPIRATORY TRACT: ICD-10-CM

## 2024-12-20 DIAGNOSIS — I50.32 CHRONIC DIASTOLIC HEART FAILURE: Primary | ICD-10-CM

## 2024-12-20 NOTE — LETTER
Patient: Axel Boles  : 1960    Encounter Date: 2024    PROGRESS NOTE    Subjective  Chief complaint: Axel Boles is a 64 y.o. male who is a long term care patient being seen and evaluated for  CXR review    HPI:  Patient seen and evaluated for CXR review.  Lungs remain diminished, nonproductive cough.  CXR obtained with improvement.  Continuous O2 maintained with trilogy at at bedtime.  Offers no complaints of F/C/N/V/D, SOB  Patient aware of results.  Verbalizes no concerns at this time.            Objective  Vital signs: .  128/76, 97.9, 87, 18, 98%    Physical Exam  Constitutional:       Appearance: Normal appearance.   HENT:      Head: Normocephalic.      Mouth/Throat:      Mouth: Mucous membranes are moist.   Eyes:      Extraocular Movements: Extraocular movements intact.   Cardiovascular:      Rate and Rhythm: Normal rate and regular rhythm.      Pulses: Normal pulses.      Heart sounds: Normal heart sounds.   Pulmonary:      Effort: Pulmonary effort is normal.      Breath sounds: Normal breath sounds.      Comments: diminished  Abdominal:      General: Bowel sounds are normal. There is no distension (Ascites).      Palpations: Abdomen is soft.   Musculoskeletal:         General: Normal range of motion.      Cervical back: Normal range of motion and neck supple.      Right lower leg: Edema present.      Left lower leg: Edema present.   Skin:     General: Skin is warm and dry.      Capillary Refill: Capillary refill takes less than 2 seconds.      Comments: .  Wounds to BLE   Neurological:      Mental Status: He is alert and oriented to person, place, and time.   Psychiatric:         Mood and Affect: Mood normal.         Behavior: Behavior normal.         Assessment/Plan  Problem List Items Addressed This Visit       COPD (chronic obstructive pulmonary disease) (Multi)      Continuous O2   encourage increased time with trilogy   Advair, albuterol, Spiriva   monitor sats   Pulmonology  following         Chronic diastolic heart failure - Primary      Stable   DIPAK diet   fluid restriction   monitor weights   BP control   Lasix, spironolactone         Congestion of respiratory tract      CXR negative for PNA/CHF   continue albuterol, guaifenesin   monitor sats         Localized edema      continue elevation and compression hose   Lasix   monitor          Medications, treatments, and labs reviewed  Continue medications and treatments as listed in EMR    BEVERLY Adorno      Electronically Signed By: BEVERLY Adorno   12/21/24 10:53 AM

## 2024-12-21 PROBLEM — J18.9 PNEUMONIA OF BOTH LOWER LOBES DUE TO INFECTIOUS ORGANISM: Status: ACTIVE | Noted: 2024-12-21

## 2024-12-21 NOTE — PROGRESS NOTES
PROGRESS NOTE    Subjective   Chief complaint: Axel Boles is a 64 y.o. male who is a long term care patient being seen and evaluated for  CXR review    HPI:  Patient seen and evaluated for CXR review.  Lungs remain diminished, nonproductive cough.  CXR obtained with improvement.  Continuous O2 maintained with trilogy at at bedtime.  Offers no complaints of F/C/N/V/D, SOB  Patient aware of results.  Verbalizes no concerns at this time.            Objective   Vital signs: .  128/76, 97.9, 87, 18, 98%    Physical Exam  Constitutional:       Appearance: Normal appearance.   HENT:      Head: Normocephalic.      Mouth/Throat:      Mouth: Mucous membranes are moist.   Eyes:      Extraocular Movements: Extraocular movements intact.   Cardiovascular:      Rate and Rhythm: Normal rate and regular rhythm.      Pulses: Normal pulses.      Heart sounds: Normal heart sounds.   Pulmonary:      Effort: Pulmonary effort is normal.      Breath sounds: Normal breath sounds.      Comments: diminished  Abdominal:      General: Bowel sounds are normal. There is no distension (Ascites).      Palpations: Abdomen is soft.   Musculoskeletal:         General: Normal range of motion.      Cervical back: Normal range of motion and neck supple.      Right lower leg: Edema present.      Left lower leg: Edema present.   Skin:     General: Skin is warm and dry.      Capillary Refill: Capillary refill takes less than 2 seconds.      Comments: .  Wounds to BLE   Neurological:      Mental Status: He is alert and oriented to person, place, and time.   Psychiatric:         Mood and Affect: Mood normal.         Behavior: Behavior normal.         Assessment/Plan   Problem List Items Addressed This Visit       COPD (chronic obstructive pulmonary disease) (Multi)      Continuous O2   encourage increased time with trilogy   Advair, albuterol, Spiriva   monitor sats   Pulmonology following         Chronic diastolic heart failure - Primary      Stable    DIPAK diet   fluid restriction   monitor weights   BP control   Lasix, spironolactone         Congestion of respiratory tract      CXR negative for PNA/CHF   continue albuterol, guaifenesin   monitor sats         Localized edema      continue elevation and compression hose   Lasix   monitor          Medications, treatments, and labs reviewed  Continue medications and treatments as listed in EMR    Dolores Miles, APRN-CNP

## 2024-12-21 NOTE — PROGRESS NOTES
PROGRESS NOTE    Subjective   Chief complaint: Axel Boles is a 64 y.o. male who is a long term care patient being seen and evaluated for general medical care and monthly follow-up    HPI:  Patient seen and evaluated for general medical care and monthly follow-up.  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  States feeling better with Trelegy.  Recent labs WNL.  Offers no complaints of F/C/N/V/D, SOB, chest pain.  Abdomen remains soft.  Appetite stable          Objective   Vital signs:  114/66, 98.0, 75, 18, 97%    Physical Exam  Constitutional:       Appearance: Normal appearance.   HENT:      Head: Normocephalic.      Mouth/Throat:      Mouth: Mucous membranes are moist.   Eyes:      Extraocular Movements: Extraocular movements intact.   Cardiovascular:      Rate and Rhythm: Normal rate and regular rhythm.      Pulses: Normal pulses.      Heart sounds: Normal heart sounds.   Pulmonary:      Effort: Pulmonary effort is normal.      Breath sounds: Normal breath sounds.      Comments: diminished  Abdominal:      General: Bowel sounds are normal. There is no distension (Ascites).      Palpations: Abdomen is soft.   Musculoskeletal:         General: Normal range of motion.      Cervical back: Normal range of motion and neck supple.      Right lower leg: Edema present.      Left lower leg: Edema present.   Skin:     General: Skin is warm and dry.      Capillary Refill: Capillary refill takes less than 2 seconds.      Comments: .  Wounds to BLE   Neurological:      Mental Status: He is alert and oriented to person, place, and time.   Psychiatric:         Mood and Affect: Mood normal.         Behavior: Behavior normal.         Assessment/Plan   Problem List Items Addressed This Visit       COPD (chronic obstructive pulmonary disease) (Multi)      Continuous O2   encourage increased time with trilogy   Advair, albuterol, Spiriva   monitor sats   Pulmonology following         Type 2 diabetes  mellitus without complication (Multi)         no S/S hyper or hypoglycemia   continue glipizide, lispro SSI, glargine   monitor BG AC/at bedtime   Routine HG A1c         Chronic diastolic heart failure      Stable   DIPAK diet   fluid restriction   monitor weights   BP control   Lasix, spironolactone         Alcoholic cirrhosis of liver with ascites (Multi) - Primary     No F/C/SOB, abdominal pain  no fluid wave  Last paracentesis 10/28/2024 with 1 L removed           Localized edema      continue elevation and compression hose   Lasix   monitor          Medications, treatments, and labs reviewed  Continue medications and treatments as listed in EMR    Dolores Miles, APRN-CNP

## 2024-12-21 NOTE — PROGRESS NOTES
PROGRESS NOTE    Subjective   Chief complaint: Axel Boles is a 64 y.o. male who is a long term care patient being seen and evaluated for PNA    HPI:  Patient seen and evaluated for PNA.  Tolerating Levaquin without issue.  Offers no complaints of SOB, F/C.  Nonproductive cough continues.  Patient states feeling better.  Abdomen soft, no fluid wave palpated.  Appetite good.  No concerns per nursing          Objective   Vital signs:  101/61, 97.3, 79, 20, 97%    Physical Exam  Constitutional:       Appearance: Normal appearance.   HENT:      Head: Normocephalic.      Mouth/Throat:      Mouth: Mucous membranes are moist.   Eyes:      Extraocular Movements: Extraocular movements intact.   Cardiovascular:      Rate and Rhythm: Normal rate and regular rhythm.      Pulses: Normal pulses.      Heart sounds: Normal heart sounds.   Pulmonary:      Effort: Pulmonary effort is normal.      Breath sounds: Normal breath sounds.      Comments: diminished  Abdominal:      General: Bowel sounds are normal. There is no distension (Ascites).      Palpations: Abdomen is soft.   Musculoskeletal:         General: Normal range of motion.      Cervical back: Normal range of motion and neck supple.      Right lower leg: Edema present.      Left lower leg: Edema present.   Skin:     General: Skin is warm and dry.      Capillary Refill: Capillary refill takes less than 2 seconds.      Comments: .  Wounds to BLE   Neurological:      Mental Status: He is alert and oriented to person, place, and time.   Psychiatric:         Mood and Affect: Mood normal.         Behavior: Behavior normal.         Assessment/Plan   Problem List Items Addressed This Visit       COPD (chronic obstructive pulmonary disease) (Multi)      Continuous O2   encourage increased time with trilogy   Advair, albuterol, Spiriva   monitor sats   Pulmonology following         Chronic diastolic heart failure      Stable   DIPAK diet   fluid restriction   monitor weights   BP  control   Lasix, spironolactone         Alcoholic cirrhosis of liver with ascites (Multi) - Primary     No F/C/SOB, abdominal pain  no fluid wave  Last paracentesis 10/28/2024 with 1 L removed           Alteration in skin integrity      cleanse wound with NS, pat dry   Aquaphor         Pneumonia of both lower lobes due to infectious organism      continue Levaquin   albuterol, guaifenesin   continuous O2   monitor sats          Medications, treatments, and labs reviewed  Continue medications and treatments as listed in EMR    Dolores Miles, APRN-CNP

## 2024-12-21 NOTE — PROGRESS NOTES
PROGRESS NOTE    Subjective   Chief complaint: Axel Boles is a 64 y.o. male who is a long term care patient being seen and evaluated for  PNA    HPI:  Patient seen and evaluated for pneumonia.  Continues to tolerate Levaquin without issue.  Offers no complaints of F/C/N/V/D.  Appetite stable.  Continuous O2 in use.  Tolerating Trelegy at at bedtime.  No concerns per nursing          Objective   Vital signs:  114/70, 97.9, 88, 20, 97%    Physical Exam  Constitutional:       Appearance: Normal appearance.   HENT:      Head: Normocephalic.      Mouth/Throat:      Mouth: Mucous membranes are moist.   Eyes:      Extraocular Movements: Extraocular movements intact.   Cardiovascular:      Rate and Rhythm: Normal rate and regular rhythm.      Pulses: Normal pulses.      Heart sounds: Normal heart sounds.   Pulmonary:      Effort: Pulmonary effort is normal.      Breath sounds: Normal breath sounds.      Comments: .  Diminished throughout  Abdominal:      General: Bowel sounds are normal. There is distension (Ascites).      Palpations: Abdomen is soft.   Musculoskeletal:         General: Normal range of motion.      Cervical back: Normal range of motion and neck supple.      Right lower leg: Edema present.      Left lower leg: Edema present.   Skin:     General: Skin is warm and dry.      Capillary Refill: Capillary refill takes less than 2 seconds.      Comments: .  Wounds to BLE   Neurological:      Mental Status: He is alert and oriented to person, place, and time.   Psychiatric:         Mood and Affect: Mood normal.         Behavior: Behavior normal.         Assessment/Plan   Problem List Items Addressed This Visit       COPD (chronic obstructive pulmonary disease) (Multi)      Continuous O2   encourage increased time with trilogy   Advair, albuterol, Spiriva   monitor sats   Pulmonology following         Type 2 diabetes mellitus without complication (Multi)         no S/S hyper or hypoglycemia   continue  glipizide, lispro SSI, glargine   monitor BG AC/at bedtime   Routine HG A1c         Pneumonia of both lower lobes due to infectious organism - Primary      continue Levaquin   albuterol, guaifenesin   continuous O2   monitor sats          Medications, treatments, and labs reviewed  Continue medications and treatments as listed in EMR    Dolores Miles, APRN-CNP

## 2025-01-15 ENCOUNTER — LAB REQUISITION (OUTPATIENT)
Dept: LAB | Facility: HOSPITAL | Age: 65
End: 2025-01-15
Payer: COMMERCIAL

## 2025-01-15 DIAGNOSIS — I50.20 UNSPECIFIED SYSTOLIC (CONGESTIVE) HEART FAILURE: ICD-10-CM

## 2025-01-15 DIAGNOSIS — R09.02 HYPOXEMIA: ICD-10-CM

## 2025-01-15 DIAGNOSIS — J96.20 ACUTE AND CHRONIC RESPIRATORY FAILURE, UNSPECIFIED WHETHER WITH HYPOXIA OR HYPERCAPNIA: ICD-10-CM

## 2025-01-15 LAB
AMMONIA PLAS-SCNC: 52 UMOL/L (ref 16–53)
ANION GAP SERPL CALC-SCNC: 7 MMOL/L (ref 10–20)
BNP SERPL-MCNC: 96 PG/ML (ref 0–99)
BUN SERPL-MCNC: 10 MG/DL (ref 6–23)
CALCIUM SERPL-MCNC: 8.5 MG/DL (ref 8.6–10.3)
CHLORIDE SERPL-SCNC: 107 MMOL/L (ref 98–107)
CO2 SERPL-SCNC: 28 MMOL/L (ref 21–32)
CREAT SERPL-MCNC: 0.99 MG/DL (ref 0.5–1.3)
EGFRCR SERPLBLD CKD-EPI 2021: 86 ML/MIN/1.73M*2
ERYTHROCYTE [DISTWIDTH] IN BLOOD BY AUTOMATED COUNT: 16 % (ref 11.5–14.5)
GLUCOSE SERPL-MCNC: 100 MG/DL (ref 74–99)
HCT VFR BLD AUTO: 28 % (ref 41–52)
HGB BLD-MCNC: 9.1 G/DL (ref 13.5–17.5)
IRON SERPL-MCNC: 66 UG/DL (ref 35–150)
MCH RBC QN AUTO: 31.4 PG (ref 26–34)
MCHC RBC AUTO-ENTMCNC: 32.5 G/DL (ref 32–36)
MCV RBC AUTO: 97 FL (ref 80–100)
NRBC BLD-RTO: 0 /100 WBCS (ref 0–0)
PLATELET # BLD AUTO: 105 X10*3/UL (ref 150–450)
POTASSIUM SERPL-SCNC: 3.4 MMOL/L (ref 3.5–5.3)
RBC # BLD AUTO: 2.9 X10*6/UL (ref 4.5–5.9)
SODIUM SERPL-SCNC: 139 MMOL/L (ref 136–145)
WBC # BLD AUTO: 6.6 X10*3/UL (ref 4.4–11.3)

## 2025-01-15 PROCEDURE — 36415 COLL VENOUS BLD VENIPUNCTURE: CPT | Mod: OUT | Performed by: INTERNAL MEDICINE

## 2025-01-15 PROCEDURE — 80048 BASIC METABOLIC PNL TOTAL CA: CPT | Mod: OUT | Performed by: INTERNAL MEDICINE

## 2025-01-15 PROCEDURE — 83540 ASSAY OF IRON: CPT | Mod: OUT | Performed by: INTERNAL MEDICINE

## 2025-01-15 PROCEDURE — 85027 COMPLETE CBC AUTOMATED: CPT | Mod: OUT | Performed by: INTERNAL MEDICINE

## 2025-01-15 PROCEDURE — 83880 ASSAY OF NATRIURETIC PEPTIDE: CPT | Mod: OUT | Performed by: INTERNAL MEDICINE

## 2025-01-15 PROCEDURE — 82140 ASSAY OF AMMONIA: CPT | Mod: OUT | Performed by: INTERNAL MEDICINE
